# Patient Record
Sex: FEMALE | NOT HISPANIC OR LATINO | URBAN - METROPOLITAN AREA
[De-identification: names, ages, dates, MRNs, and addresses within clinical notes are randomized per-mention and may not be internally consistent; named-entity substitution may affect disease eponyms.]

---

## 2017-01-20 ENCOUNTER — APPOINTMENT (RX ONLY)
Dept: URBAN - METROPOLITAN AREA CLINIC 167 | Facility: CLINIC | Age: 42
Setting detail: DERMATOLOGY
End: 2017-01-20

## 2017-01-20 DIAGNOSIS — L65.0 TELOGEN EFFLUVIUM: ICD-10-CM

## 2017-01-20 DIAGNOSIS — L67.8 OTHER HAIR COLOR AND HAIR SHAFT ABNORMALITIES: ICD-10-CM

## 2017-01-20 DIAGNOSIS — I83.9 ASYMPTOMATIC VARICOSE VEINS OF LOWER EXTREMITIES: ICD-10-CM

## 2017-01-20 PROBLEM — I83.93 ASYMPTOMATIC VARICOSE VEINS OF BILATERAL LOWER EXTREMITIES: Status: ACTIVE | Noted: 2017-01-20

## 2017-01-20 PROBLEM — K75.9 INFLAMMATORY LIVER DISEASE, UNSPECIFIED: Status: ACTIVE | Noted: 2017-01-20

## 2017-01-20 PROCEDURE — ? TREATMENT REGIMEN

## 2017-01-20 PROCEDURE — ? OTHER

## 2017-01-20 PROCEDURE — ? COUNSELING

## 2017-01-20 PROCEDURE — 99202 OFFICE O/P NEW SF 15 MIN: CPT

## 2017-01-20 ASSESSMENT — LOCATION DETAILED DESCRIPTION DERM
LOCATION DETAILED: RIGHT MEDIAL FRONTAL SCALP
LOCATION DETAILED: LEFT DISTAL PRETIBIAL REGION
LOCATION DETAILED: RIGHT DISTAL PRETIBIAL REGION
LOCATION DETAILED: POSTERIOR MID-PARIETAL SCALP

## 2017-01-20 ASSESSMENT — LOCATION SIMPLE DESCRIPTION DERM
LOCATION SIMPLE: RIGHT PRETIBIAL REGION
LOCATION SIMPLE: POSTERIOR SCALP
LOCATION SIMPLE: RIGHT SCALP
LOCATION SIMPLE: LEFT PRETIBIAL REGION

## 2017-01-20 ASSESSMENT — LOCATION ZONE DERM
LOCATION ZONE: SCALP
LOCATION ZONE: LEG

## 2017-01-20 NOTE — HPI: HAIR LOSS
How Did The Hair Loss Occur?: sudden in onset
How Severe Is Your Hair Loss?: mild
Additional History: Patient states she was taking a thyroid supplement and was told after having blood work to stop supplement. Patient did bring blood work with her today.

## 2017-01-20 NOTE — PROCEDURE: OTHER
Detail Level: Detailed
Note Text (......Xxx Chief Complaint.): This diagnosis correlates with the
Other (Free Text): Dr Sanchez and [place] cards given to patient
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\\nSo she needs to take methylated B12\\n\\nHair loss started in Oct\\nDyed her hair a lot\\nShe thought she over-processed it\\nUsing a tingly solution from [employer] which doesn't seem to help\\n\\nCrohns\\nTook mercaptopurine\\nMade her hair fall out\\nGot large blood cells\\nStopped it in July\\nWas suicidal in August\\nStarted Humira March\\nFlared about a month ago\\nStarted sulfasalazine a month ago\\nTook abx for a URI 2-3 weeks ago\\nWas also on steroids for it\\nGetting a colonoscopy so that she can possibly start a new drug\\n\\nWas taking a thyroid supplement OTC (at her own discretion), but it made her levels too high\\nStarted a couple months ago\\nStopped it after a couple weeks\\nThis was started after her hair loss started, not before\\n\\nEats a lot of protein\\n\\nOn the hair pull test, I could not get many hairs to pull out\\nOnly one hair was broken\\nShe had a photo of a lot of hair in her shower from this morning\\nShe does not have a lot of split ends\\nAlthough it is not obvious if she has hair breakage or shedding, either brittle hair from the hair coloring or telogen effluvium from her Crohns are the likely causes\\n\\nCan also check ferritin to make sure her iron stores are adequate for hair growth\\n\\n